# Patient Record
Sex: MALE | Race: BLACK OR AFRICAN AMERICAN | NOT HISPANIC OR LATINO | Employment: UNEMPLOYED | URBAN - METROPOLITAN AREA
[De-identification: names, ages, dates, MRNs, and addresses within clinical notes are randomized per-mention and may not be internally consistent; named-entity substitution may affect disease eponyms.]

---

## 2023-12-16 ENCOUNTER — APPOINTMENT (OUTPATIENT)
Dept: GENERAL RADIOLOGY | Facility: CLINIC | Age: 18
End: 2023-12-16
Attending: EMERGENCY MEDICINE
Payer: COMMERCIAL

## 2023-12-16 ENCOUNTER — HOSPITAL ENCOUNTER (EMERGENCY)
Facility: CLINIC | Age: 18
Discharge: HOME | End: 2023-12-16
Attending: EMERGENCY MEDICINE | Admitting: EMERGENCY MEDICINE
Payer: COMMERCIAL

## 2023-12-16 ENCOUNTER — APPOINTMENT (OUTPATIENT)
Dept: CT IMAGING | Facility: CLINIC | Age: 18
End: 2023-12-16
Attending: EMERGENCY MEDICINE
Payer: COMMERCIAL

## 2023-12-16 VITALS
DIASTOLIC BLOOD PRESSURE: 68 MMHG | RESPIRATION RATE: 16 BRPM | HEART RATE: 84 BPM | SYSTOLIC BLOOD PRESSURE: 132 MMHG | TEMPERATURE: 98 F | OXYGEN SATURATION: 100 %

## 2023-12-16 DIAGNOSIS — R56.9 SEIZURE (H): ICD-10-CM

## 2023-12-16 LAB
ALBUMIN SERPL BCG-MCNC: 4.1 G/DL (ref 3.5–5.2)
ALP SERPL-CCNC: 45 U/L (ref 40–150)
ALT SERPL W P-5'-P-CCNC: 13 U/L (ref 0–70)
ANION GAP SERPL CALCULATED.3IONS-SCNC: 11 MMOL/L (ref 7–15)
AST SERPL W P-5'-P-CCNC: 12 U/L (ref 0–45)
BASOPHILS # BLD AUTO: 0 10E3/UL (ref 0–0.2)
BASOPHILS NFR BLD AUTO: 0 %
BILIRUB SERPL-MCNC: 0.3 MG/DL
BUN SERPL-MCNC: 10.6 MG/DL (ref 6–20)
CALCIUM SERPL-MCNC: 8.5 MG/DL (ref 8.6–10)
CHLORIDE SERPL-SCNC: 100 MMOL/L (ref 98–107)
CREAT SERPL-MCNC: 1.05 MG/DL (ref 0.67–1.17)
DEPRECATED HCO3 PLAS-SCNC: 27 MMOL/L (ref 22–29)
EGFRCR SERPLBLD CKD-EPI 2021: >90 ML/MIN/1.73M2
EOSINOPHIL # BLD AUTO: 0.2 10E3/UL (ref 0–0.7)
EOSINOPHIL NFR BLD AUTO: 3 %
ERYTHROCYTE [DISTWIDTH] IN BLOOD BY AUTOMATED COUNT: 12.7 % (ref 10–15)
ETHANOL SERPL-MCNC: <0.01 G/DL
FLUAV RNA SPEC QL NAA+PROBE: NEGATIVE
FLUBV RNA RESP QL NAA+PROBE: NEGATIVE
GLUCOSE SERPL-MCNC: 113 MG/DL (ref 70–99)
HCT VFR BLD AUTO: 42 % (ref 40–53)
HGB BLD-MCNC: 14 G/DL (ref 13.3–17.7)
IMM GRANULOCYTES # BLD: 0 10E3/UL
IMM GRANULOCYTES NFR BLD: 0 %
LIPASE SERPL-CCNC: 28 U/L (ref 13–60)
LYMPHOCYTES # BLD AUTO: 2.2 10E3/UL (ref 0.8–5.3)
LYMPHOCYTES NFR BLD AUTO: 28 %
MAGNESIUM SERPL-MCNC: 2 MG/DL (ref 1.7–2.3)
MCH RBC QN AUTO: 29.5 PG (ref 26.5–33)
MCHC RBC AUTO-ENTMCNC: 33.3 G/DL (ref 31.5–36.5)
MCV RBC AUTO: 88 FL (ref 78–100)
MONOCYTES # BLD AUTO: 0.6 10E3/UL (ref 0–1.3)
MONOCYTES NFR BLD AUTO: 7 %
NEUTROPHILS # BLD AUTO: 4.8 10E3/UL (ref 1.6–8.3)
NEUTROPHILS NFR BLD AUTO: 62 %
NRBC # BLD AUTO: 0 10E3/UL
NRBC BLD AUTO-RTO: 0 /100
PLATELET # BLD AUTO: 184 10E3/UL (ref 150–450)
POTASSIUM SERPL-SCNC: 3.2 MMOL/L (ref 3.4–5.3)
PROT SERPL-MCNC: 6.6 G/DL (ref 6.4–8.3)
RBC # BLD AUTO: 4.75 10E6/UL (ref 4.4–5.9)
RSV RNA SPEC NAA+PROBE: NEGATIVE
SARS-COV-2 RNA RESP QL NAA+PROBE: NEGATIVE
SODIUM SERPL-SCNC: 138 MMOL/L (ref 135–145)
TROPONIN T SERPL HS-MCNC: <6 NG/L
WBC # BLD AUTO: 7.8 10E3/UL (ref 4–11)

## 2023-12-16 PROCEDURE — 87637 SARSCOV2&INF A&B&RSV AMP PRB: CPT | Performed by: EMERGENCY MEDICINE

## 2023-12-16 PROCEDURE — 80053 COMPREHEN METABOLIC PANEL: CPT | Performed by: EMERGENCY MEDICINE

## 2023-12-16 PROCEDURE — 84484 ASSAY OF TROPONIN QUANT: CPT | Performed by: EMERGENCY MEDICINE

## 2023-12-16 PROCEDURE — 93005 ELECTROCARDIOGRAM TRACING: CPT

## 2023-12-16 PROCEDURE — 85025 COMPLETE CBC W/AUTO DIFF WBC: CPT | Performed by: EMERGENCY MEDICINE

## 2023-12-16 PROCEDURE — 70450 CT HEAD/BRAIN W/O DYE: CPT

## 2023-12-16 PROCEDURE — 83690 ASSAY OF LIPASE: CPT | Performed by: EMERGENCY MEDICINE

## 2023-12-16 PROCEDURE — 96360 HYDRATION IV INFUSION INIT: CPT

## 2023-12-16 PROCEDURE — 83735 ASSAY OF MAGNESIUM: CPT | Performed by: EMERGENCY MEDICINE

## 2023-12-16 PROCEDURE — 99285 EMERGENCY DEPT VISIT HI MDM: CPT | Mod: 25

## 2023-12-16 PROCEDURE — 258N000003 HC RX IP 258 OP 636: Performed by: EMERGENCY MEDICINE

## 2023-12-16 PROCEDURE — 73030 X-RAY EXAM OF SHOULDER: CPT | Mod: RT

## 2023-12-16 PROCEDURE — 96361 HYDRATE IV INFUSION ADD-ON: CPT

## 2023-12-16 PROCEDURE — 36415 COLL VENOUS BLD VENIPUNCTURE: CPT | Performed by: EMERGENCY MEDICINE

## 2023-12-16 PROCEDURE — 250N000013 HC RX MED GY IP 250 OP 250 PS 637: Performed by: EMERGENCY MEDICINE

## 2023-12-16 PROCEDURE — 71046 X-RAY EXAM CHEST 2 VIEWS: CPT

## 2023-12-16 PROCEDURE — 82077 ASSAY SPEC XCP UR&BREATH IA: CPT | Performed by: EMERGENCY MEDICINE

## 2023-12-16 RX ORDER — POTASSIUM CHLORIDE 1.5 G/1.58G
40 POWDER, FOR SOLUTION ORAL ONCE
Status: COMPLETED | OUTPATIENT
Start: 2023-12-16 | End: 2023-12-16

## 2023-12-16 RX ORDER — LEVETIRACETAM 500 MG/1
500 TABLET ORAL 2 TIMES DAILY
Qty: 60 TABLET | Refills: 0 | Status: SHIPPED | OUTPATIENT
Start: 2023-12-16 | End: 2024-01-15

## 2023-12-16 RX ADMIN — POTASSIUM CHLORIDE 40 MEQ: 1.5 POWDER, FOR SOLUTION ORAL at 05:05

## 2023-12-16 RX ADMIN — SODIUM CHLORIDE 1000 ML: 9 INJECTION, SOLUTION INTRAVENOUS at 03:30

## 2023-12-16 ASSESSMENT — ACTIVITIES OF DAILY LIVING (ADL)
ADLS_ACUITY_SCORE: 35
ADLS_ACUITY_SCORE: 35

## 2023-12-16 NOTE — ED PROVIDER NOTES
History     Chief Complaint:  Seizures       HPI   Naveed Pennington is a 18 year old male who is otherwise healthy who presents to the emergency department with a period of altered mental status which sounds possible seizure.  Patient's sister woke up in the middle of the night and heard some noises and went to his room and found him shaking in bed.  She then had a difficult time waking him up and noted that he was confused.  When EMS arrived they noted the patient was confused as well but cleared on his way in.  At the time I evaluate the patient he is sleepy but awake and able to answer questions appropriately.  Sisters at bedside reports that he is acting more like himself.  Patient denies any recent illnesses, fever, headache, vision changes, chest pain, nausea or vomiting.  He has no known history of seizures.  However sister notes 1 similar episode like this before in the past which was not worked up.  They also note that they had a cousin who  suddenly of a seizure while he was swimming.  Patient reports that he has been under a significant mount of stress studying for finals and has not been eating and drinking very much.  Sisters report that he has not been sleeping very much either.  He denies any drug use.  Denies any prescription medications.    Independent Historian:   Sister supplemented above history.    Physical Exam   Patient Vitals for the past 24 hrs:   BP Temp Temp src Pulse Resp SpO2   23 0545 132/68 -- -- 84 16 100 %   23 0330 -- -- -- -- -- 100 %   23 0315 -- -- -- -- -- 100 %   23 0300 -- -- -- -- -- 100 %   23 0245 -- -- -- -- -- 100 %   23 0239 -- -- -- -- -- 100 %   23 0238 118/76 98  F (36.7  C) Temporal 110 22 100 %        Physical Exam  General: sleepy but wakes appropriately to voice   Head: No signs of trauma.   Eyes: The pupils are equal, round, and reactive to light. Conjunctivae and sclerae are normal. No nystagmus. Full ROM of both  eyes and appears symmetric without obvious palsy.   ENT: TMs are clear bilaterally without effusion.   Neck: Normal range of motion.   CV: Regular rate. S1/S2. No murmurs.   Resp: Lungs are clear without wheezes or rales. No respiratory distress.   GI: Abdomen is soft, no rigidity, guarding, or rebound. No distension. No tenderness to palpation in any quadrant.    MS: Normal tone. Joints grossly normal without effusions. No asymmetric leg swelling, calf or thigh tenderness.    Skin: No rash or lesions noted. Normal capillary refill noted  Neuro:   Oriented   Speech is normal and fluent.   Face is symmetric without droop. CN's II-XII intact. Negative pronator drift. Finger to nose intact. Heel to shin intact.   Right Arm: pain with ROM of the right shoulder   Left Arm: Good  strength. 5/5 elbow flexion. 5/5 elbow extension. Sensation intact to light touch.   Right Le/5 straight leg raise, 5/5 knee flexion, 5/5 knee extension, 5/5 dorsiflexion, 5/5 plantar flexion. Sensation intact to light touch.   Left Le/5 straight leg raise, 5/5 knee flexion, 5/5 knee extension, 5/5 dorsiflexion, 5/5 plantar flexion. Sensation intact to light touch.    Psych:  Normal affect.  Appropriate interactions.     Emergency Department Course   ECG  ECG results from 23   EKG 12-lead, tracing only     Value    Systolic Blood Pressure     Diastolic Blood Pressure     Ventricular Rate 98    Atrial Rate 98    CT Interval 146    QRS Duration 82        QTc 439    P Axis 82    R AXIS 78    T Axis 64    Interpretation ECG      Sinus rhythm  Normal ECG  No previous ECGs available          Imaging:  XR Chest 2 Views   Final Result   IMPRESSION: No evidence of active cardiopulmonary disease.             XR Shoulder Right G/E 3 Views   Final Result   IMPRESSION: No displaced fracture. Humeral head projects slightly posterior to the glenoid on the scapular view, though this is likely positioning as the glenohumeral joint appears  normally aligned otherwise. The acromioclavicular joint is intact.      CT Head w/o Contrast   Final Result   IMPRESSION:   1.  No acute intracranial process.           Laboratory:  Labs Ordered and Resulted from Time of ED Arrival to Time of ED Departure   COMPREHENSIVE METABOLIC PANEL - Abnormal       Result Value    Sodium 138      Potassium 3.2 (*)     Carbon Dioxide (CO2) 27      Anion Gap 11      Urea Nitrogen 10.6      Creatinine 1.05      GFR Estimate >90      Calcium 8.5 (*)     Chloride 100      Glucose 113 (*)     Alkaline Phosphatase 45      AST 12      ALT 13      Protein Total 6.6      Albumin 4.1      Bilirubin Total 0.3     LIPASE - Normal    Lipase 28     MAGNESIUM - Normal    Magnesium 2.0     ETHYL ALCOHOL LEVEL - Normal    Alcohol ethyl <0.01     INFLUENZA A/B, RSV, & SARS-COV2 PCR - Normal    Influenza A PCR Negative      Influenza B PCR Negative      RSV PCR Negative      SARS CoV2 PCR Negative     TROPONIN T, HIGH SENSITIVITY - Normal    Troponin T, High Sensitivity <6     CBC WITH PLATELETS AND DIFFERENTIAL    WBC Count 7.8      RBC Count 4.75      Hemoglobin 14.0      Hematocrit 42.0      MCV 88      MCH 29.5      MCHC 33.3      RDW 12.7      Platelet Count 184      % Neutrophils 62      % Lymphocytes 28      % Monocytes 7      % Eosinophils 3      % Basophils 0      % Immature Granulocytes 0      NRBCs per 100 WBC 0      Absolute Neutrophils 4.8      Absolute Lymphocytes 2.2      Absolute Monocytes 0.6      Absolute Eosinophils 0.2      Absolute Basophils 0.0      Absolute Immature Granulocytes 0.0      Absolute NRBCs 0.0          Emergency Department Course & Assessments:      Interventions:  Medications   sodium chloride 0.9% BOLUS 1,000 mL (0 mLs Intravenous Stopped 12/16/23 0505)   potassium chloride (KLOR-CON) Packet 40 mEq (40 mEq Oral $Given 12/16/23 0505)          Disposition:  The patient was discharged to home.     Impression & Plan      Medical Decision Making:  Patient is a  18-year-old male who is brought to the emergency department after a period of altered mental status which sounds like it may have been a seizure based on patient's sister's description.  Please see HPI for further details.  Patient's workup Emergency Department does not show significant seizure nidus.  His workup is otherwise reassuring.  This does not completely rule out possible seizure especially in light that there may have been multiple events like this before in the past.  Will start the patient on Keppra and have him follow-up closely with neurology for further evaluation and treatment.  Until he is cleared by neurology I recommend that he not drive, swim alone or operate any heavy machinery.  I also advised him to get plenty of sleep, eat well and stay well-hydrated.  He is return to the emergency ferment for further evaluation and treatment.    Diagnosis:    ICD-10-CM    1. Possible Seizure  R56.9 Adult Neurology  Referral           Discharge Medications:  Discharge Medication List as of 12/16/2023  5:44 AM        START taking these medications    Details   levETIRAcetam (KEPPRA) 500 MG tablet Take 1 tablet (500 mg) by mouth 2 times daily for 30 days, Disp-60 tablet, R-0, Local Print              MD Fely Lawson, Derrell Reynaga MD  12/16/23 9588

## 2023-12-16 NOTE — ED TRIAGE NOTES
Patient presents via EMS with complaint of altered mental status.  According to his sister, she went in his room and found him shaking, had a hard time waking him up and when he did he was confused.  On EMS arrival, patient seemed confused as well but he cleared on the way in.  Denies pain or complaints on arrival, no recent illness, no seizure history.     Triage Assessment (Adult)       Row Name 12/16/23 0239          Triage Assessment    Airway WDL WDL        Respiratory WDL    Respiratory WDL WDL        Skin Circulation/Temperature WDL    Skin Circulation/Temperature WDL WDL        Cardiac WDL    Cardiac WDL WDL        Peripheral/Neurovascular WDL    Peripheral Neurovascular WDL WDL        Cognitive/Neuro/Behavioral WDL    Cognitive/Neuro/Behavioral WDL WDL

## 2023-12-16 NOTE — ED NOTES
Assumed care of pt while primary RN was on break. Rounded on patient to collect labs and start IV fluids. Sisters at bedside. No acute needs identified at this time.

## 2023-12-18 LAB
ATRIAL RATE - MUSE: 98 BPM
DIASTOLIC BLOOD PRESSURE - MUSE: NORMAL MMHG
INTERPRETATION ECG - MUSE: NORMAL
P AXIS - MUSE: 82 DEGREES
PR INTERVAL - MUSE: 146 MS
QRS DURATION - MUSE: 82 MS
QT - MUSE: 344 MS
QTC - MUSE: 439 MS
R AXIS - MUSE: 78 DEGREES
SYSTOLIC BLOOD PRESSURE - MUSE: NORMAL MMHG
T AXIS - MUSE: 64 DEGREES
VENTRICULAR RATE- MUSE: 98 BPM